# Patient Record
Sex: MALE | Race: WHITE | NOT HISPANIC OR LATINO | Employment: OTHER | URBAN - NONMETROPOLITAN AREA
[De-identification: names, ages, dates, MRNs, and addresses within clinical notes are randomized per-mention and may not be internally consistent; named-entity substitution may affect disease eponyms.]

---

## 2019-07-03 ENCOUNTER — APPOINTMENT (OUTPATIENT)
Dept: LAB | Facility: HOSPITAL | Age: 39
End: 2019-07-03

## 2019-07-03 ENCOUNTER — TRANSCRIBE ORDERS (OUTPATIENT)
Dept: ADMINISTRATIVE | Facility: HOSPITAL | Age: 39
End: 2019-07-03

## 2019-07-03 DIAGNOSIS — N46.9 MALE INFERTILITY, UNSPECIFIED: Primary | ICD-10-CM

## 2019-07-03 LAB
CHARACTER SMN: NORMAL
COLLECTION METHOD SMN: ABNORMAL
EPI CELLS #/AREA SMN HPF: ABNORMAL /HPF
LIQUEFACTION TIME SMN: NORMAL MIN
NON PROGRESSIVE MOTILITY: 9.3 %
RBC #/AREA SMN HPF: ABNORMAL /HPF
SEX ABSTIN DURATION TIME PATIENT: 4 DAYS
SLOW PROGRESSIVE MOTILITY: 14.2 %
SMI: 208 (ref 80–400)
SPEC CONTAINER SPEC: ABNORMAL
SPECIMEN VOL SMN: 2.1 ML
SPERM # SMN: 67.8 MILLIONS/ML
SPERM IMMOTILE NFR SMN: 46.6 %
SPERM MOTILE NFR SMN: 53.4 % MOTILE (ref 50–100)
SPERM PROG NFR SMN: 29.9 % (ref 25–100)
SPERM SMN: 13.3 % NORMAL (ref 15–100)
VISC SMN: NORMAL CP
WBC SMN QL: ABNORMAL /HPF
WHO STANDARD: ABNORMAL

## 2019-07-03 PROCEDURE — 89320 SEMEN ANAL VOL/COUNT/MOT: CPT | Performed by: SURGERY

## 2022-02-18 ENCOUNTER — OFFICE VISIT (OUTPATIENT)
Dept: WOUND CARE | Facility: HOSPITAL | Age: 42
End: 2022-02-18

## 2022-02-18 DIAGNOSIS — M79.671 PAIN IN RIGHT FOOT: ICD-10-CM

## 2022-02-18 DIAGNOSIS — S90.851A SUPERFICIAL FOREIGN BODY, RIGHT FOOT, INITIAL ENCOUNTER: ICD-10-CM

## 2022-02-18 DIAGNOSIS — A49.9 BACTERIAL INFECTION, UNSPECIFIED: ICD-10-CM

## 2022-02-18 PROCEDURE — G0463 HOSPITAL OUTPT CLINIC VISIT: HCPCS

## 2022-02-18 PROCEDURE — 99203 OFFICE O/P NEW LOW 30 MIN: CPT | Performed by: PODIATRIST

## 2022-02-18 PROCEDURE — 10120 INC&RMVL FB SUBQ TISS SMPL: CPT | Performed by: PODIATRIST

## 2023-06-01 ENCOUNTER — LAB (OUTPATIENT)
Dept: LAB | Facility: HOSPITAL | Age: 43
End: 2023-06-01
Payer: COMMERCIAL

## 2023-06-01 ENCOUNTER — OFFICE VISIT (OUTPATIENT)
Dept: FAMILY MEDICINE CLINIC | Facility: CLINIC | Age: 43
End: 2023-06-01

## 2023-06-01 VITALS
BODY MASS INDEX: 25.77 KG/M2 | DIASTOLIC BLOOD PRESSURE: 90 MMHG | OXYGEN SATURATION: 100 % | TEMPERATURE: 98.5 F | HEART RATE: 63 BPM | SYSTOLIC BLOOD PRESSURE: 131 MMHG | HEIGHT: 70 IN | WEIGHT: 180 LBS

## 2023-06-01 DIAGNOSIS — Z00.00 WELLNESS EXAMINATION: Primary | ICD-10-CM

## 2023-06-01 DIAGNOSIS — Z11.59 ENCOUNTER FOR HEPATITIS C SCREENING TEST FOR LOW RISK PATIENT: ICD-10-CM

## 2023-06-01 DIAGNOSIS — R53.83 OTHER FATIGUE: ICD-10-CM

## 2023-06-01 DIAGNOSIS — E55.9 VITAMIN D DEFICIENCY: ICD-10-CM

## 2023-06-01 DIAGNOSIS — E66.3 OVERWEIGHT (BMI 25.0-29.9): ICD-10-CM

## 2023-06-01 DIAGNOSIS — Z12.5 SCREENING PSA (PROSTATE SPECIFIC ANTIGEN): ICD-10-CM

## 2023-06-01 DIAGNOSIS — Z00.00 WELLNESS EXAMINATION: ICD-10-CM

## 2023-06-01 LAB
25(OH)D3 SERPL-MCNC: 25.8 NG/ML (ref 30–100)
ALBUMIN SERPL-MCNC: 4.3 G/DL (ref 3.5–5)
ALBUMIN/GLOB SERPL: 1.4 G/DL (ref 1.1–2.5)
ALP SERPL-CCNC: 60 U/L (ref 24–120)
ALT SERPL W P-5'-P-CCNC: 23 U/L (ref 0–50)
ANION GAP SERPL CALCULATED.3IONS-SCNC: 5 MMOL/L (ref 4–13)
AST SERPL-CCNC: 27 U/L (ref 7–45)
AUTO MIXED CELLS #: 0.3 10*3/MM3 (ref 0.1–2.6)
AUTO MIXED CELLS %: 7 % (ref 0.1–24)
BILIRUB SERPL-MCNC: 0.3 MG/DL (ref 0.1–1)
BILIRUB UR QL STRIP: NEGATIVE
BUN SERPL-MCNC: 12 MG/DL (ref 5–21)
BUN/CREAT SERPL: 13.3
CALCIUM SPEC-SCNC: 8.7 MG/DL (ref 8.4–10.4)
CHLORIDE SERPL-SCNC: 104 MMOL/L (ref 98–110)
CHOLEST SERPL-MCNC: 179 MG/DL (ref 130–200)
CLARITY UR: CLEAR
CO2 SERPL-SCNC: 32 MMOL/L (ref 24–31)
COLOR UR: YELLOW
CREAT SERPL-MCNC: 0.9 MG/DL (ref 0.5–1.4)
EGFRCR SERPLBLD CKD-EPI 2021: 109.4 ML/MIN/1.73
ERYTHROCYTE [DISTWIDTH] IN BLOOD BY AUTOMATED COUNT: 12.6 % (ref 12.3–15.4)
GLOBULIN UR ELPH-MCNC: 3.1 GM/DL
GLUCOSE SERPL-MCNC: 102 MG/DL (ref 70–100)
GLUCOSE UR STRIP-MCNC: NEGATIVE MG/DL
HCT VFR BLD AUTO: 44.1 % (ref 37.5–51)
HCV AB SER DONR QL: NORMAL
HDLC SERPL-MCNC: 59 MG/DL
HGB BLD-MCNC: 15 G/DL (ref 13–17.7)
HGB UR QL STRIP.AUTO: NEGATIVE
KETONES UR QL STRIP: NEGATIVE
LDLC SERPL CALC-MCNC: 108 MG/DL (ref 0–99)
LDLC/HDLC SERPL: 1.82 {RATIO}
LEUKOCYTE ESTERASE UR QL STRIP.AUTO: NEGATIVE
LYMPHOCYTES # BLD AUTO: 1.8 10*3/MM3 (ref 0.7–3.1)
LYMPHOCYTES NFR BLD AUTO: 37.1 % (ref 19.6–45.3)
MCH RBC QN AUTO: 29.8 PG (ref 26.6–33)
MCHC RBC AUTO-ENTMCNC: 34 G/DL (ref 31.5–35.7)
MCV RBC AUTO: 87.5 FL (ref 79–97)
NEUTROPHILS NFR BLD AUTO: 2.7 10*3/MM3 (ref 1.7–7)
NEUTROPHILS NFR BLD AUTO: 55.9 % (ref 42.7–76)
NITRITE UR QL STRIP: NEGATIVE
PH UR STRIP.AUTO: 7.5 [PH] (ref 5–8)
PLATELET # BLD AUTO: 192 10*3/MM3 (ref 140–450)
PMV BLD AUTO: 10.4 FL (ref 6–12)
POTASSIUM SERPL-SCNC: 3.9 MMOL/L (ref 3.5–5.3)
PROT SERPL-MCNC: 7.4 G/DL (ref 6.3–8.7)
PROT UR QL STRIP: NEGATIVE
PSA SERPL-MCNC: 0.66 NG/ML (ref 0–4)
RBC # BLD AUTO: 5.04 10*6/MM3 (ref 4.14–5.8)
SODIUM SERPL-SCNC: 141 MMOL/L (ref 135–145)
SP GR UR STRIP: 1.01 (ref 1–1.03)
TRIGL SERPL-MCNC: 64 MG/DL (ref 0–149)
TSH SERPL DL<=0.05 MIU/L-ACNC: 1.74 UIU/ML (ref 0.27–4.2)
UROBILINOGEN UR QL STRIP: NORMAL
VLDLC SERPL-MCNC: 12 MG/DL (ref 5–40)
WBC NRBC COR # BLD: 4.8 10*3/MM3 (ref 3.4–10.8)

## 2023-06-01 PROCEDURE — 86803 HEPATITIS C AB TEST: CPT

## 2023-06-01 PROCEDURE — G0103 PSA SCREENING: HCPCS

## 2023-06-01 PROCEDURE — 81003 URINALYSIS AUTO W/O SCOPE: CPT

## 2023-06-01 PROCEDURE — 36415 COLL VENOUS BLD VENIPUNCTURE: CPT

## 2023-06-01 PROCEDURE — 84403 ASSAY OF TOTAL TESTOSTERONE: CPT

## 2023-06-01 PROCEDURE — 84402 ASSAY OF FREE TESTOSTERONE: CPT

## 2023-06-01 PROCEDURE — 82306 VITAMIN D 25 HYDROXY: CPT

## 2023-06-01 PROCEDURE — 80061 LIPID PANEL: CPT

## 2023-06-01 PROCEDURE — 80050 GENERAL HEALTH PANEL: CPT

## 2023-06-01 NOTE — PROGRESS NOTES
"Chief Complaint  Establish Care, Testosterone, and Annual Exam    Subjective    History of Present Illness      Patient presents to [facility] for   History of Present Illness  Patient is here to establish care with us. States he is on no medication at this time. No concerns with his health. He would like to get blood work and also his testosterone levels.       Review of Systems    I have reviewed and agree with the HPI information as above.  Krista Garcia, APRN     Objective   Vital Signs:   /90   Pulse 63   Temp 98.5 °F (36.9 °C)   Ht 177.8 cm (70\")   Wt 81.6 kg (180 lb)   SpO2 100%   BMI 25.83 kg/m²     BMI is >= 25 and <30. (Overweight) The following options were offered after discussion;: weight loss educational material (shared in after visit summary), exercise counseling/recommendations, and nutrition counseling/recommendations      Physical Exam     MAYUR-7: Over the last two weeks, how often have you been bothered by the following problems?  Feeling nervous, anxious or on edge: Not at all  Not being able to stop or control worrying: Not at all  Worrying too much about different things: Not at all  Trouble Relaxing: Not at all  Being so restless that it is hard to sit still: Not at all  Becoming easily annoyed or irritable: Not at all  Feeling afraid as if something awful might happen: Not at all  MAYUR 7 Total Score: 0  If you checked any problems, how difficult have these problems made it for you to do your work, take care of things at home, or get along with other people: Not difficult at all    PHQ-2 Depression Screening  Little interest or pleasure in doing things? 0-->not at all   Feeling down, depressed, or hopeless? 0-->not at all   PHQ-2 Total Score 0     PHQ-9 Depression Screening  Little interest or pleasure in doing things? 0-->not at all   Feeling down, depressed, or hopeless? 0-->not at all   Trouble falling or staying asleep, or sleeping too much? " 0-->not at all   Feeling tired or having little energy? 0-->not at all   Poor appetite or overeating? 0-->not at all   Feeling bad about yourself - or that you are a failure or have let yourself or your family down? 0-->not at all   Trouble concentrating on things, such as reading the newspaper or watching television? 0-->not at all   Moving or speaking so slowly that other people could have noticed? Or the opposite - being so fidgety or restless that you have been moving around a lot more than usual? 0-->not at all   Thoughts that you would be better off dead, or of hurting yourself in some way? 0-->not at all   PHQ-9 Total Score 0   If you checked off any problems, how difficult have these problems made it for you to do your work, take care of things at home, or get along with other people? not difficult at all      Result Review  Data Reviewed:                   Assessment and Plan      Diagnoses and all orders for this visit:    1. Wellness examination (Primary)  -     CBC Auto Differential; Future  -     Comprehensive Metabolic Panel; Future  -     Lipid Panel; Future  -     PSA Screen; Future  -     Vitamin D,25-Hydroxy; Future  -     Urinalysis With Culture If Indicated - Urine, Clean Catch; Future  -     TSH; Future  -     Testosterone, Free, Total; Future  -     Hepatitis C antibody; Future    2. Encounter for hepatitis C screening test for low risk patient    3. Screening PSA (prostate specific antigen)  -     PSA Screen; Future    4. Other fatigue  -     Testosterone, Free, Total; Future    5. Vitamin D deficiency  -     Vitamin D,25-Hydroxy; Future    6. Overweight (BMI 25.0-29.9)      Patient is here today to establish care and to have a yearly wellness exam completed.  He states that he is never really been to the doctor and is not on any medications.  Patient denies any significant family history.  We will get lab work including testosterone levels today.  We will call patient with results.         Follow Up   Return in about 1 year (around 6/1/2024).  Patient was given instructions and counseling regarding his condition or for health maintenance advice. Please see specific information pulled into the AVS if appropriate.     The following counseling and eduction was discussed with the patient and will print with AVS.    Anticipatory Guidance/Psychosocial Screening - to include:   Second hand smoke   Tobacco Use counseling   Substance abuse counseling   Exercise    At least 800-1,000 units of vitamin D daily and consideration of screening in persons with low sun exposure or other risk   factors   1,200 mg. of calcium daily in adults 50 years and older.    Folic acid (0.4mg to 0.8 mg/day for females of reproductive age (USPSTF - A Recommendation) The USPSTF   recommends that all women who are planning or capable of pregnancy take a daily supplement containing 0.4 to 0.8 mg   (400 to 800 µg) of folic acid.      Aspirin use -  for the primary prevention of cardiovascular   disease (CVD) and colorectal cancer (CRC) in adults aged 50 to 59 years who have a 10% or greater 10-year CVD risk, are   not at increased risk for bleeding, have a life expectancy of at least 10 years, and are willing to take low-dose aspirin daily   for at least 10 years.    Discussion of risks and benefits of hormone replacement prophylaxis and alternative therapies in women   Polypharmacy   Safe sex/STD High-intensity behavioral counseling to prevent sexually transmitted infections for all adults at increased   risk for STIs. “High- intensity” behavior counseling is defined by USPSTF as multiple sessions of behavioral counseling   providing some provision of education, skill training or support from changes in sexual behavior that promotes risk   reduction and avoidance. USPSTF - B Recommendation   Limit exposure of UV light   Oral health  .Counseling for Diet  Safety Issues - to include:  Anticipatory Guidance/Safety Issues  References   Domestic Violence: The [place] Preventive Services Task Force (USPSTF) recommends that clinicians screen women of   childbearing age for intimate partner violence (IPV), such as domestic violence, and provide or refer women who screen   positive to intervention services. This recommendation applies to women who do not have signs or symptoms of abuse.    Woman Abuse Screening Tool (WAST)    Personalized Safety Plan    Smoke and carbon monoxide detectors   Firearms use and safe storage of   Appropriate protective/safety equipment for such activities as biking, skating and skiing   Seat belt use    Patient was given instructions and counseling regarding his/her condition or for health maintenance advice. Please see specific information pulled into the AVS if appropriate.

## 2023-06-02 ENCOUNTER — TELEPHONE (OUTPATIENT)
Dept: FAMILY MEDICINE CLINIC | Facility: CLINIC | Age: 43
End: 2023-06-02

## 2023-06-02 NOTE — PROGRESS NOTES
Labs look wonderful overall  Vitamin d is low. Just take an otc supplement 5000 units daily    Testosterone pending

## 2023-06-02 NOTE — TELEPHONE ENCOUNTER
----- Message from MARTINE Gaviria sent at 6/2/2023  7:30 AM CDT -----  Labs look wonderful overall  Vitamin d is low. Just take an otc supplement 5000 units daily    Testosterone pending

## 2023-06-06 LAB
TESTOST FREE SERPL-MCNC: 8.2 PG/ML (ref 6.8–21.5)
TESTOST SERPL-MCNC: 450 NG/DL (ref 264–916)

## 2023-06-07 ENCOUNTER — TELEPHONE (OUTPATIENT)
Dept: FAMILY MEDICINE CLINIC | Facility: CLINIC | Age: 43
End: 2023-06-07
Payer: COMMERCIAL